# Patient Record
Sex: MALE | Race: WHITE
[De-identification: names, ages, dates, MRNs, and addresses within clinical notes are randomized per-mention and may not be internally consistent; named-entity substitution may affect disease eponyms.]

---

## 2020-10-30 ENCOUNTER — HOSPITAL ENCOUNTER (OUTPATIENT)
Dept: HOSPITAL 53 - M LABSMTC | Age: 50
End: 2020-10-30
Attending: ANESTHESIOLOGY
Payer: COMMERCIAL

## 2020-10-30 DIAGNOSIS — Z01.812: Primary | ICD-10-CM

## 2020-10-30 DIAGNOSIS — Z20.828: ICD-10-CM

## 2020-11-04 ENCOUNTER — HOSPITAL ENCOUNTER (OUTPATIENT)
Dept: HOSPITAL 53 - M OPP | Age: 50
Discharge: HOME | End: 2020-11-04
Attending: SURGERY
Payer: COMMERCIAL

## 2020-11-04 VITALS — SYSTOLIC BLOOD PRESSURE: 111 MMHG | DIASTOLIC BLOOD PRESSURE: 69 MMHG

## 2020-11-04 VITALS — BODY MASS INDEX: 25.17 KG/M2 | WEIGHT: 175.8 LBS | HEIGHT: 70 IN

## 2020-11-04 DIAGNOSIS — K64.8: ICD-10-CM

## 2020-11-04 DIAGNOSIS — I10: ICD-10-CM

## 2020-11-04 DIAGNOSIS — K57.30: ICD-10-CM

## 2020-11-04 DIAGNOSIS — Z79.899: ICD-10-CM

## 2020-11-04 DIAGNOSIS — Z12.11: Primary | ICD-10-CM

## 2020-11-04 DIAGNOSIS — Z87.891: ICD-10-CM

## 2020-11-04 NOTE — ROOR
________________________________________________________________________________

Patient Name: Elbert Day           Procedure Date: 11/4/2020 10:19 AM

MRN: [id number]                          Account Number: [id number]

YOB: 1970                Age: 50

Room: [facility]                            Gender: Male

Note Status: Finalized                 

________________________________________________________________________________

 

Procedure:           Colonoscopy

Indications:         Screening for colorectal malignant neoplasm

Providers:           Jeremiah Zarco MD

Referring MD:        Gustavo Maradiaga

Requesting Provider: 

Medicines:           Monitored Anesthesia Care

Complications:       No immediate complications.

________________________________________________________________________________

Procedure:           Pre-Anesthesia Assessment:

                     - Prior to the procedure, a History and Physical was 

                     performed, and patient medications and allergies were 

                     reviewed. The patient is competent. The risks and 

                     benefits of the procedure and the sedation options and 

                     risks were discussed with the patient. All questions were 

                     answered and informed consent was obtained. Patient 

                     identification and proposed procedure were verified by 

                     the physician, the nurse and the anesthetist in the 

                     endoscopy suite. Mental Status Examination: alert and 

                     oriented. Airway Examination: normal oropharyngeal airway 

                     and neck mobility. Respiratory Examination: clear to 

                     auscultation. CV Examination: normal. Prophylactic 

                     Antibiotics: The patient does not require prophylactic 

                     antibiotics. Prior Anticoagulants: The patient has taken 

                     no previous anticoagulant or antiplatelet agents. ASA 

                     Grade Assessment: II - A patient with mild systemic 

                     disease. After reviewing the risks and benefits, the 

                     patient was deemed in satisfactory condition to undergo 

                     the procedure. The anesthesia plan was to use monitored 

                     anesthesia care (MAC). Immediately prior to 

                     administration of medications, the patient was 

                     re-assessed for adequacy to receive sedatives. The heart 

                     rate, respiratory rate, oxygen saturations, blood 

                     pressure, adequacy of pulmonary ventilation, and response 

                     to care were monitored throughout the procedure. The 

                     physical status of the patient was re-assessed after the 

                     procedure.

                     The Colonoscope was introduced through the anus and 

                     advanced to the terminal ileum, with identification of 

                     the appendiceal orifice and IC valve. The colonoscopy was 

                     somewhat difficult due to a tortuous colon. Successful 

                     completion of the procedure was aided by applying 

                     abdominal pressure. The patient tolerated the procedure 

                     well. The quality of the bowel preparation was good.

                                                                                

Findings:

     The perianal and digital rectal examinations were normal.

     Multiple small-mouthed diverticula were found in the sigmoid colon and 

     descending colon. There was no evidence of diverticular bleeding.

     Internal hemorrhoids were found during retroflexion. The hemorrhoids were 

     small.

     The entire examined colon appeared normal.

                                                                                

Impression:          - Mild diverticulosis in the sigmoid colon and in the 

                     descending colon. There was no evidence of diverticular 

                     bleeding.

                     - Internal hemorrhoids.

                     - The entire examined colon is normal.

                     - No specimens collected.

Recommendation:      - Discharge patient to home (ambulatory).

                     - Repeat colonoscopy in 10 years for screening purposes.

                                                                                

Attending Participation:

     I personally performed the entire procedure.

                                                                                

 

Jeremiah Zarco MD

_______________________

Jeremiah Zarco MD

11/4/2020 10:48:46 AM

Electronically signed by Jeremiah Zarco MD

Number of Addenda: 0

 

Note Initiated On: 11/4/2020 10:19 AM

Estimated Blood Loss:

     Estimated blood loss: none.